# Patient Record
Sex: FEMALE | URBAN - METROPOLITAN AREA
[De-identification: names, ages, dates, MRNs, and addresses within clinical notes are randomized per-mention and may not be internally consistent; named-entity substitution may affect disease eponyms.]

---

## 2017-07-18 ENCOUNTER — VIRTUAL VISIT (OUTPATIENT)
Dept: FAMILY MEDICINE | Facility: OTHER | Age: 37
End: 2017-07-18

## 2017-08-20 ENCOUNTER — VIRTUAL VISIT (OUTPATIENT)
Dept: FAMILY MEDICINE | Facility: OTHER | Age: 37
End: 2017-08-20

## 2017-08-20 NOTE — PROGRESS NOTES
"Date:   Clinician: Becki Love  Clinician NPI: [id number]  Patient: Khadijah Helms  Patient : 1980  Patient Address: [address] [place] [id number]  Patient Phone: [phone]  Visit Protocol: UTI  Patient Summary:  Khadijah is a 37 year old ( : 1980 ) female who initiated a Visit for a presumed bladder infection. When asked the question \"Please sign me up to receive news, health information and promotions from [employer].\", Khadijah responded \"No\".    Her symptoms began 3 days ago and consist of hesitation, urgency, urinary frequency, foul smelling urine, and dysuria.   Symptom Details   Urinary Frequency: Several times each hour    She denies flank pain, loss of appetite, chills, fever, urinary incontinence, vaginal discharge, abdominal pain, recent antibiotic use, hematuria, vomiting, and nausea. Khadijah has never had kidney stones. She has not been hospitalized, been a patient in a nursing home, or had a catheter in the past two weeks. She denies risk factors for sexually transmitted infections.   Khadijah has had two (2) UTIs in the past 12 months. Her most recent bladder infection was not within the last 4 weeks. Her current symptoms are similar to the previous UTI symptoms. She took nitrofurantoin for her last infection and found it to be effective.    Khadijah typically gets yeast infections when she takes antibiotics.  She denies pregnancy and denies breastfeeding. She has menstruated in the past month.   She smokes or uses smokeless tobacco.   MEDICATIONS:   Patient free text response:  Ginger  , ALLERGIES:  NKDA   Clinician Response:  Dear Khadijah,  Based on the information you have provided, you likely have a recurrent, uncomplicated bladder infection, also known as a urinary tract infection (UTI).   To treat your infection, I am prescribing:   Nitrofurantoin (Macrobid). Swallow one (1) tablet twice a day for 5 days. Take the tablet with food. Continue taking the tablets even if you feel " better before all the medication is gone. There is no refill with this prescription.   Antibiotic selections by the provider are based on safety and effectiveness. You may or may not be prescribed the same medication that you took for your last bladder infection.   Some people develop allergies to antibiotics. If you notice a new rash, significant swelling, or difficulty breathing, stop the medication immediately and go into a clinic for physical evaluation.   To help treat your current UTI and prevent future occurrences, remember to:     Drink 8-10, 8-ounce glasses of water daily.    Urinate after sexual intercourse.    Wipe front to back after using the bathroom.     Some women may develop a yeast infection as a side effect of taking antibiotics. Because you noted that you typically get yeast infections when you are taking antibiotics, I am also prescribing:   Fluconazole (Diflucan) 150 mg oral tablet. Take this medication only if you notice symptoms of a yeast infection (vaginal discharge that is white, thick, and odorless). There are no refills with this prescription.   You should visit a clinic for a follow-up visit if your symptoms do not improve in 1-2 days or if you experience another urinary tract infection soon after completing this treatment.  If you become pregnant during this course of treatment, stop taking the medication and contact your primary care provider.   Finally, as your provider, I need you to know that becoming tobacco-free is the most important thing you can do to protect your current and future health.   Diagnosis: Recurrent Bladder Infection  Diagnosis ICD: N39.0  Prescription: nitrofurantoin (Macrobid) 100mg oral tablet 10 tablets, 5 days supply. Take one tablet by mouth two times a day for 5 days. Refills: 0, Refill as needed: no, Allow substitutions: yes  Prescription: fluconazole (Diflucan) 150mg oral tablet 1 tablet, 1 days supply. Take one tablet by mouth one time a day for 1 day.  Refills: 0, Refill as needed: no, Allow substitutions: yes  Pharmacy: [id number] - [phone] - [address] [place] [address]

## 2017-09-14 ENCOUNTER — VIRTUAL VISIT (OUTPATIENT)
Dept: FAMILY MEDICINE | Facility: OTHER | Age: 37
End: 2017-09-14

## 2017-09-14 NOTE — PROGRESS NOTES
"Date:   Clinician: Kristine Morales  Clinician NPI: [id number]  Patient: Khadijah Helms  Patient : 1980  Patient Address: [address] [place] [id number]  Patient Phone: [phone]  Visit Protocol: UTI  Patient Summary:  Khadijah is a 37 year old ( : 1980 ) female who initiated a Visit for a presumed bladder infection. When asked the question \"Please sign me up to receive news, health information and promotions from [employer].\", Khadijah responded \"No\".    Her symptoms began 3 days ago and consist of dysuria, hesitation, urgency, urinary frequency, and foul smelling urine.   Symptom Details   Urinary Frequency: Several times each hour    She denies hematuria, urinary incontinence, recent antibiotic use, feeling feverish, abdominal pain, vaginal discharge, flank pain, vomiting, nausea, loss of appetite, and chills. Khadijah has never had kidney stones. She has not been hospitalized, been a patient in a nursing home, or had a catheter in the past two weeks. She denies risk factors for sexually transmitted infections.   Khadijah has had two (2) UTIs in the past 12 months. Her most recent bladder infection was not within the last 4 weeks. Her current symptoms are similar to the previous UTI symptoms. She took nitrofurantoin for her last infection and found it to be effective.    Khadijah typically gets yeast infections when she takes antibiotics.  She denies pregnancy and denies breastfeeding. She has menstruated in the past month.   She smokes or uses smokeless tobacco.   MEDICATIONS:   Patient free text response:  Ginger  , ALLERGIES:  NKDA   Clinician Response:  Dear Khadijah,   I am sorry you are not feeling well. Your health is our priority. Based on the information you have provided, an in-person evaluation is required. Please be seen in a clinic or urgent care to receive the additional care you need.  You will not be charged for this Visit. Thank you for trusting us with your care.  Finally, as your " provider, I need you to know that becoming tobacco-free is the most important thing you can do to protect your current and future health.   Diagnosis: Refer for additional evaluation  Diagnosis ICD: R69  Additional Clinician Notes: It looks like you've been treated for 2 bladder infections via [facility] in the last 3 months, the most recent was less than 4 weeks ago. For this reason, I am not able to treat you online. Please be seen in a clinic. A provider will be able to do a urine test to determine the best treatment for you and discuss recurrent infections.

## 2017-10-27 ENCOUNTER — VIRTUAL VISIT (OUTPATIENT)
Dept: FAMILY MEDICINE | Facility: OTHER | Age: 37
End: 2017-10-27

## 2017-10-27 NOTE — PROGRESS NOTES
"Date:   Clinician: Melinda Lomeli  Clinician NPI: [id number]  Patient: Khadijah Helms  Patient : 1980  Patient Address: [address] [place] [id number]  Patient Phone: [phone]  Visit Protocol: UTI  Patient Summary:  Khadijah is a 37 year old ( : 1980 ) female who initiated a Visit for a presumed bladder infection. When asked the question \"Please sign me up to receive news, health information and promotions from [employer].\", Khadijah responded \"No\".    Her symptoms began 2 days ago and consist of hesitation, urgency, dysuria, urinary frequency, and foul smelling urine.   Symptom Details   Urinary Frequency: Several times each hour    She denies abdominal pain, vaginal discharge, flank pain, vomiting, recent antibiotic use, hematuria, urinary incontinence, feeling feverish, loss of appetite, chills, and nausea. Khadijah has never had kidney stones. She has not been hospitalized, been a patient in a nursing home, or had a catheter in the past two weeks. She denies risk factors for sexually transmitted infections.   Khadijah has had two (2) UTIs in the past 12 months. Her most recent bladder infection was not within the last 4 weeks. Her current symptoms are similar to the previous UTI symptoms. She took nitrofurantoin for her last infection and found it to be effective.    Khadijah typically gets yeast infections when she takes antibiotics.  She denies pregnancy and denies breastfeeding. She has menstruated in the past month.   She smokes or uses smokeless tobacco.   MEDICATIONS:   Patient free text response:  Ginger  , ALLERGIES:  NKDA   Clinician Response:  Dear Khadijah,  Based on the information you have provided, you likely have a recurrent, uncomplicated bladder infection, also known as a urinary tract infection (UTI).   To treat your infection, I am prescribing:   Nitrofurantoin (Macrobid). Swallow one (1) tablet twice a day for 5 days. Take the tablet with food. Continue taking the tablets even if you " feel better before all the medication is gone. There is no refill with this prescription.   Antibiotic selections by the provider are based on safety and effectiveness. You may or may not be prescribed the same medication that you took for your last bladder infection.   Some people develop allergies to antibiotics. If you notice a new rash, significant swelling, or difficulty breathing, stop the medication immediately and go into a clinic for physical evaluation.   To help treat your current UTI and prevent future occurrences, remember to:     Drink 8-10, 8-ounce glasses of water daily.    Urinate after sexual intercourse.    Wipe front to back after using the bathroom.     Some women may develop a yeast infection as a side effect of taking antibiotics. Because you noted that you typically get yeast infections when you are taking antibiotics, I am also prescribing:   Fluconazole (Diflucan) 150 mg oral tablet. Take this medication only if you notice symptoms of a yeast infection (vaginal discharge that is white, thick, and odorless). There are no refills with this prescription.   You should visit a clinic for a follow-up visit if your symptoms do not improve in 1-2 days or if you experience another urinary tract infection soon after completing this treatment.  If you become pregnant during this course of treatment, stop taking the medication and contact your primary care provider.   Finally, as your provider, I need you to know that becoming tobacco-free is the most important thing you can do to protect your current and future health.   Diagnosis: Recurrent Bladder Infection  Diagnosis ICD: N39.0  Additional Clinician Notes: Khadijah, This is your third UTI in 4 months. If you develop additional UTI symptoms in the next 3-4 months, you need to be seen in a clinical setting and can not be evaluated/treated online for recurrent symptoms. This is for your best care.  Prescription: nitrofurantoin (Macrobid) 100mg oral tablet  10 tablets, 5 days supply. Take one tablet by mouth two times a day for 5 days. Refills: 0, Refill as needed: no, Allow substitutions: yes  Prescription: fluconazole (Diflucan) 150mg oral tablet 1 tablet, 1 days supply. Take one tablet by mouth one time a day for 1 day. Refills: 0, Refill as needed: no, Allow substitutions: yes  Pharmacy: [facility] [id number] - [phone] - [address] [place] [address]

## 2019-07-11 ENCOUNTER — VIRTUAL VISIT (OUTPATIENT)
Dept: FAMILY MEDICINE | Facility: OTHER | Age: 39
End: 2019-07-11

## 2019-07-11 NOTE — PROGRESS NOTES
"Date:   Clinician: Yordy Garcia  Clinician NPI: [id number]  Patient: Khadijah Olivat  Patient : 1980  Patient Address: [address] [place] [id number]  Patient Phone: [phone]  Visit Protocol: UTI  Patient Summary:  Khadijah is a 39 year old ( : 1980 ) female who initiated a Visit for a presumed bladder infection. When asked the question \"Please sign me up to receive news, health information and promotions. \", Khadijah responded \"No\".   Her symptoms started 1-3 days ago and consist of urinary frequency, urgency, dysuria, and feeling as if the bladder is never empty.   Symptom details   Urine color: The color of her urine is yellow.    Denied symptoms include chills, vaginal discharge, urinary incontinence, vomiting, vaginal itching, foul-smelling urine, nausea, flank pain, and abdominal pain. She does not feel feverish.   Over-the-counter medications or home remedies used to relieve the current symptoms as reported by the patient (free text): Motrin cranberry juice   Precipitating events  Khadijah denies having a sexually transmitted disease.  Pertinent medical history  Khadijah has had a bladder infection before but has not had any in the past 12 months. Her current symptoms are similar to her previous bladder infection symptoms.   Nitrofurantoin (Macrobid) has been effective in treating her past bladder infections.   Khadijah typically gets a yeast infection when she takes antibiotics. She has used fluconazole (Diflucan) to treat previous yeast infections. 2 doses of fluconazole (Diflucan) has typically been needed for symptoms to resolve in the past.  Khadijah has not been prescribed antibiotics to prevent frequent or repeated bladder infections in the past. She has not experienced problems or side effects with any of the common antibiotics used to treat bladder infections.   Khadijah does not have a history of kidney stones. She has not used a catheter or been a patient in a hospital or nursing home in " the past 2 weeks.   Khadijah smokes or uses smokeless tobacco.   She denies pregnancy and denies breastfeeding. She has menstruated in the past month.     MEDICATIONS: Remeron oral, ALLERGIES: NKDA  Clinician Response:  Dear Khadijah,  Based on the information you have provided, you likely have an acute urinary tract infection, also called a bladder infection. Bladder infections occur when bacteria from the outside of the body enters the urinary tract. Any part of the urinary system can be infected, but the bladder is the most common.  Medication information  I am prescribing:     Nitrofurantoin monohyd/m-cryst (Macrobid) 100 mg oral capsule. Take 1 capsule by mouth every 12 hours for 5 days. Take this medication with food. There are no refills with this prescription.   The medication I prescribed for your bladder infection is an antibiotic. Continue taking the medication until it is gone even if you feel better.   Because you usually get a yeast infection when taking antibiotics, I am also prescribing:     Fluconazole (Diflucan) 150 mg oral tablet. Take 1 tablet by mouth in a single dose. Repeat dose in 3 days if symptoms are still present. There are no refills with this prescription.   Self care  Urination helps to flush bacteria from the urinary tract. For this reason, drinking water and urinating often helps relieve some urinary symptoms and can decrease your risk of getting bladder infections in the future.  Other steps you can take to prevent future bladder infections include:     Wipe front to back after using the bathroom    Urinate after sexual intercourse    Avoid using deodorant sprays, douches, or powders in the vaginal area     Becoming tobacco-free is one of the best things you can do to improve your health. For help with quitting tobacco, you can call 8-075-QUIT-NOW (1-497.330.7317) or visit smokefree.gov.  When to seek care  Please make an appointment to be seen in a clinic or urgent care if any of the  following occur:     You develop new symptoms or your symptoms become worse    You have medication side effects that make it difficult to take them as prescribed    Your symptoms do not improve within 1-2 days of starting treatment    You have symptoms of a bladder infection that return shortly after completing treatment     It is possible to have an allergic reaction to an antibiotic even if you have not had one in the past. If you notice a new rash, significant swelling, or difficulty breathing, stop taking this medication immediately and go to a clinic or urgent care.   Diagnosis: Acute uncomplicated bladder infection  Diagnosis ICD: N39.0  Prescription: nitrofurantoin monohyd/m-cryst (Macrobid) 100 mg oral capsule 10 capsule, 5 days supply. Take 1 capsule by mouth every 12 hours for 5 days. Refills: 0, Refill as needed: no, Allow substitutions: yes  Prescription: fluconazole (Diflucan) 150 mg oral tablet 2 tablet, 4 days supply. Take 1 tablet by mouth in a single dose, repeat dose in 3 days if symptoms are still present. Refills: 0, Refill as needed: no, Allow substitutions: yes  Pharmacy: [facility] [id number] - [phone] - [address] [place] [address]

## 2020-04-01 NOTE — PROGRESS NOTES
"Date:   Clinician: Joel Wegener  Clinician NPI: [id number]  Patient: Khadijah Helms  Patient : 1980  Patient Address: [address] [place] [id number]  Patient Phone: [phone]  Visit Protocol: UTI  Patient Summary:  Khadijah is a 37 year old ( : 1980 ) female who initiated a Visit for a presumed bladder infection. When asked the question \"Please sign me up to receive news, health information and promotions from [employer].\", Khadijah responded \"No\".    Her symptoms began 3 days ago and consist of flank pain, urinary frequency, hesitation, urgency, and dysuria.   Symptom Details     Urinary Frequency: Several times each hour     Flank Pain: pain present before UTI symptoms, denies tenderness      She denies recent antibiotic use, vomiting, hematuria, foul smelling urine, nausea, loss of appetite, chills, fever, urinary incontinence, vaginal discharge, and abdominal pain. Khadijah has never had kidney stones. She has not been hospitalized, been a patient in a nursing home, or had a catheter in the past two weeks. She denies risk factors for sexually transmitted infections.   Khadijah has had one (1) UTI in the past 12 months. Her most recent bladder infection was not within the last 4 weeks. Her current symptoms are similar to the previous UTI symptoms. She took nitrofurantoin for her last infection and found it to be effective.    Khadijah typically gets yeast infections when she takes antibiotics.  She states she is not pregnant and denies breastfeeding. She has menstruated in the past month.   She smokes or uses smokeless tobacco.   MEDICATIONS:  Ibuprofen (Advil, Motrin)   Patient free text response:  Ginger  , ALLERGIES:  NKDA   Clinician Response:  Dear Khadijah,  Based on the information you have provided, you likely have a bladder infection, also called an acute urinary tract infection (UTI).   To treat your infection, I am prescribing:   Nitrofurantoin (Macrobid). Swallow one (1) tablet twice a day " for 5 days. Take the tablet with food. Continue taking the tablets even if you feel better before all the medication is gone. There is no refill with this prescription.   Antibiotic selections by the provider are based on safety and effectiveness. You may or may not be prescribed the same medication that you took for your last bladder infection.   Some people develop allergies to antibiotics. If you notice a new rash, significant swelling, or difficulty breathing, stop the medication immediately and go into a clinic for physical evaluation.   To help treat your current UTI and prevent future occurrences, remember to:     Drink 8-10, 8-ounce glasses of water daily.    Urinate after sexual intercourse.    Wipe front to back after using the bathroom.     Some women may develop a yeast infection as a side effect of taking antibiotics. If you notice symptoms of a yeast infection, [facility] can help treat that condition as well. Simply log in and complete another Visit, which will cover all of the necessary questions to determine the best treatment for you.   You should visit a clinic for a follow-up visit if your symptoms do not improve in 1-2 days or if you experience another urinary tract infection soon after completing this treatment.  If you become pregnant during this course of treatment, stop taking the medication and contact your primary care provider.   Finally, as your provider, I need you to know that becoming tobacco-free is the most important thing you can do to protect your current and future health.   Diagnosis: Acute Uncomplicated Bladder Infection  Diagnosis ICD: N39.0  Prescription: nitrofurantoin (Macrobid) 100mg oral tablet 10 tablets, 5 days supply. Take one tablet by mouth two times a day for 5 days. Refills: 0, Refill as needed: no, Allow substitutions: yes  Pharmacy: [facility] [id number] - [phone] - [address] [place] [address]   8